# Patient Record
Sex: MALE | Race: WHITE | NOT HISPANIC OR LATINO | RURAL
[De-identification: names, ages, dates, MRNs, and addresses within clinical notes are randomized per-mention and may not be internally consistent; named-entity substitution may affect disease eponyms.]

---

## 2024-04-04 ENCOUNTER — OFFICE VISIT (OUTPATIENT)
Dept: FAMILY MEDICINE | Facility: CLINIC | Age: 79
End: 2024-04-04
Payer: MEDICARE

## 2024-04-04 VITALS
DIASTOLIC BLOOD PRESSURE: 74 MMHG | BODY MASS INDEX: 22.59 KG/M2 | RESPIRATION RATE: 16 BRPM | HEIGHT: 71 IN | OXYGEN SATURATION: 98 % | HEART RATE: 73 BPM | WEIGHT: 161.38 LBS | TEMPERATURE: 98 F | SYSTOLIC BLOOD PRESSURE: 139 MMHG

## 2024-04-04 DIAGNOSIS — H81.03 MENIERE'S DISEASE OF BOTH EARS: Primary | ICD-10-CM

## 2024-04-04 DIAGNOSIS — L23.7 ALLERGIC CONTACT DERMATITIS DUE TO PLANTS, EXCEPT FOOD: ICD-10-CM

## 2024-04-04 DIAGNOSIS — R11.0 NAUSEA: ICD-10-CM

## 2024-04-04 PROBLEM — I25.118 ATHSCL HEART DISEASE OF NATIVE COR ART W OTH ANG PCTRS: Status: ACTIVE | Noted: 2022-06-16

## 2024-04-04 PROBLEM — E78.2 MIXED HYPERLIPIDEMIA: Status: ACTIVE | Noted: 2019-12-11

## 2024-04-04 PROBLEM — I10 HYPERTENSION, ESSENTIAL: Status: ACTIVE | Noted: 2019-12-11

## 2024-04-04 PROCEDURE — 99203 OFFICE O/P NEW LOW 30 MIN: CPT | Mod: ,,, | Performed by: FAMILY MEDICINE

## 2024-04-04 RX ORDER — EZETIMIBE 10 MG/1
10 TABLET ORAL DAILY
COMMUNITY

## 2024-04-04 RX ORDER — MELOXICAM 15 MG/1
15 TABLET ORAL DAILY
COMMUNITY

## 2024-04-04 RX ORDER — OLMESARTAN MEDOXOMIL, AMLODIPINE AND HYDROCHLOROTHIAZIDE TABLET 20/5/12.5 MG 20; 5; 12.5 MG/1; MG/1; MG/1
1 TABLET ORAL DAILY
COMMUNITY

## 2024-04-04 RX ORDER — VIT C/E/ZN/COPPR/LUTEIN/ZEAXAN 250MG-90MG
1000 CAPSULE ORAL DAILY
COMMUNITY

## 2024-04-04 RX ORDER — PREDNISONE 20 MG/1
20 TABLET ORAL DAILY
Qty: 5 TABLET | Refills: 0 | Status: SHIPPED | OUTPATIENT
Start: 2024-04-04 | End: 2024-04-09

## 2024-04-04 RX ORDER — ONDANSETRON 4 MG/1
4 TABLET, FILM COATED ORAL EVERY 8 HOURS PRN
Qty: 20 TABLET | Refills: 0 | Status: SHIPPED | OUTPATIENT
Start: 2024-04-04

## 2024-04-04 RX ORDER — METAPROTERENOL SULFATE 10 MG
1 TABLET ORAL DAILY
COMMUNITY

## 2024-04-04 RX ORDER — ASPIRIN 81 MG/1
81 TABLET ORAL DAILY
COMMUNITY

## 2024-04-04 RX ORDER — GLUCOSAM/CHONDRO/HERB 149/HYAL 750-100 MG
1000 TABLET ORAL DAILY
COMMUNITY

## 2024-04-04 RX ORDER — ASCORBIC ACID 500 MG
500 TABLET ORAL DAILY
COMMUNITY

## 2024-04-04 RX ORDER — POLYETHYLENE GLYCOL 3350 17 G/17G
17 POWDER, FOR SOLUTION ORAL
COMMUNITY

## 2024-04-04 RX ORDER — DIAZEPAM 5 MG/1
5 TABLET ORAL EVERY 8 HOURS PRN
Qty: 20 TABLET | Refills: 0 | Status: SHIPPED | OUTPATIENT
Start: 2024-04-04 | End: 2024-05-04

## 2024-04-04 NOTE — PROGRESS NOTES
"Clinic Note    Patient Name: Ryan Milan  : 1945  MRN: [id number]    Chief Complaint   Patient presents with    Dizziness     States symptom started about day before yesterday around 8p.m. and has progressively gotten worse.     Rash     States left wrist area itching. States not sure if poison oak or ivy.     [facility]    Health Maintenance     Hepatitis C Screening Never done  Lipid Panel Never done  COVID-19 Vaccine(1) Never done  TETANUS VACCINE Never done  Shingles Vaccine(1 of 2) Never done  RSV Vaccine (Age 60+ and Pregnant patients)(1 - 1-dose 60+ series) Never done  Pneumococcal Vaccines (Age 65+)(1 of 1 - PCV) Never done  Influenza Vaccine(1) Never done         HPI:    Mr. Ryan Milan is a 78 y.o. male who presents to clinic today with CC of dizziness X 2 days. Reports it has progressively worsened. Reports he has a h/o Meniere's Disease. Reports it feels similar to prior attacks.  Reports rash to L wrist with itching. Reports that he is not sure if it is poison oak or poison ivy. States he broke out several days ago after he did some yard work.  Reports he was diagnosed with Meniere's disease years ago by Dr. Saba. States, however, he ended up at the [facility] in [place] and it took a long time to get the initial episode under control.  Reports previously valium has worked well when he has a flare. Reports he typically has a flare about once per year.   Reports he is typically nauseated with this but reports nausea only started this morning. States he has, however, not vomited.  Denies chest pain. Denies SOB. Reports he does have a Cardiologist for "check ups". Reports at some point in the past he was told to take 81 mg ASA. Reports, however, other than a family history of heart disease he has no personal history.  Denies significant sinus congestion or recent cough. Reports that he typically does not get sick. Reports, however, he did have COVID-19 several years ago. " Approximately 1.5 years ago he had a temporal HA to L side. Reports it would not go away. Reports it did temporarily improve with advil. Reports he went to an ophthalmologist because he has a h/o glaucoma and cataracts. He was also concerned about giant cell arteritis. Reports he had a full work up with MRI/etc. Reports he told him that he thought he had a sinus infection. Reports he took augmentin. Reports, however, this had no affect on headache. Reports, however, the headache completely resolved 3 months later.   Patient is, otherwise, without complaints.     Medications:  Medication List with Changes/Refills   New Medications    DIAZEPAM (VALIUM) 5 MG TABLET    Take 1 tablet (5 mg total) by mouth every 8 (eight) hours as needed (ears/dizzines).    ONDANSETRON (ZOFRAN) 4 MG TABLET    Take 1 tablet (4 mg total) by mouth every 8 (eight) hours as needed for Nausea.    PREDNISONE (DELTASONE) 20 MG TABLET    Take 1 tablet (20 mg total) by mouth once daily. for 5 days   Current Medications    ASCORBIC ACID, VITAMIN C, (VITAMIN C) 500 MG TABLET    Take 500 mg by mouth once daily.    ASPIRIN (ECOTRIN) 81 MG EC TABLET    Take 81 mg by mouth once daily.    CHOLECALCIFEROL, VITAMIN D3, (VITAMIN D3) 25 MCG (1,000 UNIT) CAPSULE    Take 1,000 Units by mouth once daily.    EZETIMIBE (ZETIA) 10 MG TABLET    Take 10 mg by mouth once daily.    FLAXSEED-EVENING PRIM-BILBERRY 250-125-10 MG CAP    Take 2 capsules by mouth.    MELOXICAM (MOBIC) 15 MG TABLET    Take 15 mg by mouth once daily.    MULTIVITAMIN WITH MINERALS TABLET    Take 2 capsules by mouth once daily.    NETTLE-PUMPKIN-SAW PALM-MIN 17 (PROSTATE THERAPY) CAP    Take 1 tablet by mouth Daily. Life extension Ultra Prostate Formula; pt buys OTC    OLMESARTAN-AMLODIPIN-HCTHIAZID 20-5-12.5 MG TAB    Take 1 tablet by mouth Daily.    OMEGA 3-DHA-EPA-FISH OIL 1,000 MG (120 MG-180 MG) CAP    Take 1,000 mg by mouth Daily.    POLYETHYLENE GLYCOL (GLYCOLAX) 17 GRAM PWPK    Take 17 g  "by mouth.        Allergies: Atorvastatin, Iodinated contrast media, and Rosuvastatin      Past Medical History:    Past Medical History:   Diagnosis Date    BPH (benign prostatic hyperplasia)     Cataract     CKD (chronic kidney disease)     Glaucoma     Meniere disease     Personal history of kidney stones        Past Surgical History:    Past Surgical History:   Procedure Laterality Date    CATARACT EXTRACTION      CIRCUMCISION      at age 13    LITHOTRIPSY      TONSILLECTOMY           Social History:    Social History     Tobacco Use   Smoking Status Never   Smokeless Tobacco Never     Social History     Substance and Sexual Activity   Alcohol Use Never     Social History     Substance and Sexual Activity   Drug Use Never         Family History:    Family History   Problem Relation Age of Onset    Thyroid disease Mother     Heart attack Father     Hodgkin's lymphoma Father        Review of Systems:    Review of Systems   Constitutional:  Negative for appetite change, chills, fatigue, fever and unexpected weight change.   Eyes:  Negative for visual disturbance.   Respiratory:  Negative for cough and shortness of breath.    Cardiovascular:  Negative for chest pain and leg swelling.   Gastrointestinal:  Positive for nausea. Negative for abdominal pain, change in bowel habit, constipation, diarrhea and vomiting.   Musculoskeletal:  Negative for arthralgias.   Integumentary:  Positive for rash.   Neurological:  Positive for dizziness. Negative for headaches.   Psychiatric/Behavioral:  The patient is not nervous/anxious.         Vitals:    Vitals:    04/04/24 0816   BP: 139/74   BP Location: Left arm   Patient Position: Sitting   BP Method: Medium (Automatic)   Pulse: 73   Resp: 16   Temp: 97.5 °F (36.4 °C)   TempSrc: Oral   SpO2: 98%   Weight: 73.2 kg (161 lb 6.4 oz)   Height: 5' 10.5" (1.791 m)       Body mass index is 22.83 kg/m².    Wt Readings from Last 3 Encounters:   04/04/24 0816 73.2 kg (161 lb 6.4 oz)    "     Physical Exam:    Physical Exam  Constitutional:       General: He is not in acute distress.     Appearance: Normal appearance.   HENT:      Right Ear: Tympanic membrane normal.      Left Ear: Tympanic membrane normal.      Nose: Nose normal.      Mouth/Throat:      Mouth: Mucous membranes are moist.      Pharynx: Oropharynx is clear.   Eyes:      Conjunctiva/sclera: Conjunctivae normal.   Cardiovascular:      Rate and Rhythm: Normal rate and regular rhythm.      Heart sounds: Normal heart sounds. No murmur heard.  Pulmonary:      Effort: Pulmonary effort is normal. No respiratory distress.      Breath sounds: Normal breath sounds. No wheezing, rhonchi or rales.   Abdominal:      General: Bowel sounds are normal.      Palpations: Abdomen is soft.      Tenderness: There is no abdominal tenderness.   Musculoskeletal:      Cervical back: Neck supple.      Right lower leg: No edema.      Left lower leg: No edema.   Skin:     Findings: Rash present.   Neurological:      General: No focal deficit present.      Mental Status: He is alert. Mental status is at baseline.   Psychiatric:         Mood and Affect: Mood normal.         Assessment/Plan:   1. Meniere's disease of both ears  -     diazePAM (VALIUM) 5 MG tablet; Take 1 tablet (5 mg total) by mouth every 8 (eight) hours as needed (ears/dizzines).  Dispense: 20 tablet; Refill: 0 -  reviewed and appropriate. For short term use only. UDS deferred.     2. Nausea  -     ondansetron (ZOFRAN) 4 MG tablet; Take 1 tablet (4 mg total) by mouth every 8 (eight) hours as needed for Nausea.  Dispense: 20 tablet; Refill: 0    3. Allergic contact dermatitis due to plants, except food  -     predniSONE (DELTASONE) 20 MG tablet; Take 1 tablet (20 mg total) by mouth once daily. 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  Patient voiced understanding and is agreeable to plan.      Jessica Sánchez MD    Family Medicine

## 2024-06-25 ENCOUNTER — OFFICE VISIT (OUTPATIENT)
Dept: FAMILY MEDICINE | Facility: CLINIC | Age: 79
End: 2024-06-25
Payer: MEDICARE

## 2024-06-25 VITALS
SYSTOLIC BLOOD PRESSURE: 138 MMHG | OXYGEN SATURATION: 98 % | TEMPERATURE: 97 F | BODY MASS INDEX: 22.65 KG/M2 | WEIGHT: 161.81 LBS | HEIGHT: 71 IN | DIASTOLIC BLOOD PRESSURE: 78 MMHG | HEART RATE: 65 BPM | RESPIRATION RATE: 18 BRPM

## 2024-06-25 DIAGNOSIS — H81.03 MENIERE'S DISEASE OF BOTH EARS: Primary | ICD-10-CM

## 2024-06-25 DIAGNOSIS — H65.93 FLUID LEVEL BEHIND TYMPANIC MEMBRANE OF BOTH EARS: ICD-10-CM

## 2024-06-25 DIAGNOSIS — I10 HYPERTENSION, ESSENTIAL: ICD-10-CM

## 2024-06-25 DIAGNOSIS — I25.118 ATHSCL HEART DISEASE OF NATIVE COR ART W OTH ANG PCTRS: ICD-10-CM

## 2024-06-25 DIAGNOSIS — R42 DIZZINESS: ICD-10-CM

## 2024-06-25 DIAGNOSIS — R11.0 NAUSEA: ICD-10-CM

## 2024-06-25 PROCEDURE — 99213 OFFICE O/P EST LOW 20 MIN: CPT | Mod: ,,, | Performed by: FAMILY MEDICINE

## 2024-06-25 RX ORDER — DIAZEPAM 5 MG/1
5 TABLET ORAL EVERY 8 HOURS PRN
Qty: 20 TABLET | Refills: 0 | Status: SHIPPED | OUTPATIENT
Start: 2024-06-25 | End: 2024-07-25

## 2024-06-25 RX ORDER — PREDNISONE 20 MG/1
20 TABLET ORAL DAILY
Qty: 5 TABLET | Refills: 0 | Status: SHIPPED | OUTPATIENT
Start: 2024-06-25 | End: 2024-06-30

## 2024-06-25 RX ORDER — ONDANSETRON 4 MG/1
4 TABLET, FILM COATED ORAL EVERY 8 HOURS PRN
Qty: 20 TABLET | Refills: 0 | Status: SHIPPED | OUTPATIENT
Start: 2024-06-25

## 2024-06-25 NOTE — PROGRESS NOTES
Clinic Note    Patient Name: Ryan Milan  : 1945  MRN: [id number]    Chief Complaint   Patient presents with    Dizziness    Emesis       HPI:    Mr. Ryan Milan is a 78 y.o. male who presents to clinic today with CC of dizziness and emesis X 3 days. Reports initially he did not have nausea but the nausea/vomiting started last night. Reports he vomited twice last night and twice this morning.   Patient has a h/o Meniere's disease. Reports he typically has flares once or twice per year over the past 30 years. Reports that recent flares have been more frequent than he has previously had them in the past. His last flare was in early April of this year. 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 Reports he does not get enough sleep. Denies any other symptoms including HA, numbness, tingling, weakness, or slurred speech. He reports this is typical of his prior flares with Meniere's Disease.  He previously followed with ENT (Dr. Saba) but he has since retired. He has not seen an ENT recently.   Denies HA. BP is up some today. Reports BP has been well controlled. Reports he has a h/o migraines but rarely has HA.   Patient is, otherwise, without complaints.     Medications:  Medication List with Changes/Refills   New Medications    PREDNISONE (DELTASONE) 20 MG TABLET    Take 1 tablet (20 mg total) by mouth once daily. for 5 days   Current Medications    ASCORBIC ACID, VITAMIN C, (VITAMIN C) 500 MG TABLET    Take 500 mg by mouth once daily.    ASPIRIN (ECOTRIN) 81 MG EC TABLET    Take 81 mg by mouth once daily.    CHOLECALCIFEROL, VITAMIN D3, (VITAMIN D3) 25 MCG (1,000 UNIT) CAPSULE    Take 1,000 Units by mouth once daily.    EZETIMIBE (ZETIA) 10 MG TABLET    Take 10 mg by mouth once daily.    FLAXSEED-EVENING PRIM-BILBERRY 250-125-10 MG CAP    Take 2 capsules by mouth.    MELOXICAM (MOBIC) 15 MG TABLET    Take 15 mg by mouth once  daily.    MULTIVITAMIN WITH MINERALS TABLET    Take 2 capsules by mouth once daily.    NETTLE-PUMPKIN-SAW PALM-MIN 17 (PROSTATE THERAPY) CAP    Take 1 tablet by mouth Daily. Life extension Ultra Prostate Formula; pt buys OTC    OLMESARTAN-AMLODIPIN-HCTHIAZID 20-5-12.5 MG TAB    Take 1 tablet by mouth Daily.    OMEGA 3-DHA-EPA-FISH OIL 1,000 MG (120 MG-180 MG) CAP    Take 1,000 mg by mouth Daily.    POLYETHYLENE GLYCOL (GLYCOLAX) 17 GRAM PWPK    Take 17 g by mouth.   Changed and/or Refilled Medications    Modified Medication Previous Medication    DIAZEPAM (VALIUM) 5 MG TABLET diazePAM (VALIUM) 5 MG tablet       Take 1 tablet (5 mg total) by mouth every 8 (eight) hours as needed (ears/dizzines).    Take 1 tablet (5 mg total) by mouth every 8 (eight) hours as needed (ears/dizzines).    ONDANSETRON (ZOFRAN) 4 MG TABLET ondansetron (ZOFRAN) 4 MG tablet       Take 1 tablet (4 mg total) by mouth every 8 (eight) hours as needed for Nausea.    Take 1 tablet (4 mg total) by mouth every 8 (eight) hours as needed for Nausea.        Allergies: Atorvastatin, Iodinated contrast media, and Rosuvastatin      Past Medical History:    Past Medical History:   Diagnosis Date    BPH (benign prostatic hyperplasia)     Cataract     CKD (chronic kidney disease)     Glaucoma     Meniere disease     Personal history of kidney stones        Past Surgical History:    Past Surgical History:   Procedure Laterality Date    CATARACT EXTRACTION      CIRCUMCISION      at age 13    LITHOTRIPSY      TONSILLECTOMY           Social History:    Social History     Tobacco Use   Smoking Status Never   Smokeless Tobacco Never     Social History     Substance and Sexual Activity   Alcohol Use Never     Social History     Substance and Sexual Activity   Drug Use Never         Family History:    Family History   Problem Relation Name Age of Onset    Thyroid disease Mother      Heart attack Father      Hodgkin's lymphoma Father         Review of  "Systems:    Review of Systems   Constitutional:  Negative for appetite change, chills, fatigue, fever and unexpected weight change.   Eyes:  Negative for visual disturbance.   Respiratory:  Negative for cough and shortness of breath.    Cardiovascular:  Negative for chest pain and leg swelling.   Gastrointestinal:  Positive for nausea and vomiting. Negative for abdominal pain, change in bowel habit, constipation and diarrhea.   Musculoskeletal:  Negative for arthralgias.   Integumentary:  Negative for rash.   Neurological:  Positive for dizziness. Negative for headaches.   Psychiatric/Behavioral:  The patient is not nervous/anxious.         Vitals:    Vitals:    06/25/24 0855 06/25/24 0952   BP: (!) 161/65 138/78   BP Location: Left arm Left arm   Patient Position: Sitting Sitting   BP Method: Large (Automatic) Large (Automatic)   Pulse: 65    Resp: 18    Temp: 97.4 °F (36.3 °C)    TempSrc: Oral    SpO2: 98%    Weight: 73.4 kg (161 lb 12.8 oz)    Height: 5' 10.5" (1.791 m)        Body mass index is 22.89 kg/m².    Wt Readings from Last 3 Encounters:   06/25/24 0855 73.4 kg (161 lb 12.8 oz)   04/04/24 0816 73.2 kg (161 lb 6.4 oz)        Physical Exam:    Physical Exam  Constitutional:       General: He is not in acute distress.     Appearance: Normal appearance.   HENT:      Ears:      Comments: + fluid level     Nose: Nose normal.      Mouth/Throat:      Mouth: Mucous membranes are moist.      Pharynx: Oropharynx is clear.   Eyes:      Conjunctiva/sclera: Conjunctivae normal.   Cardiovascular:      Rate and Rhythm: Normal rate and regular rhythm.      Heart sounds: Normal heart sounds. No murmur heard.  Pulmonary:      Effort: Pulmonary effort is normal. No respiratory distress.      Breath sounds: Normal breath sounds. No wheezing, rhonchi or rales.   Abdominal:      General: Bowel sounds are normal.      Palpations: Abdomen is soft.      Tenderness: There is no abdominal tenderness.   Musculoskeletal:      " Cervical back: Neck supple.      Right lower leg: No edema.      Left lower leg: No edema.   Skin:     Findings: No rash.   Neurological:      General: No focal deficit present.      Mental Status: He is alert. Mental status is at baseline.      Cranial Nerves: No cranial nerve deficit.   Psychiatric:         Mood and Affect: Mood normal.       Assessment/Plan:   1. Meniere's disease of both ears  -     Ambulatory referral/consult to ENT; Future; Expected date: 07/02/2024 - referral to Dr. Porras due to the increasing in frequency of symptoms  -     diazePAM (VALIUM) 5 MG tablet; Take 1 tablet (5 mg total) by mouth every 8 (eight) hours as needed (ears/dizzines).  Dispense: 20 tablet; Refill: 0 -  reviewed and appropriate. UDS deferred as this is for short term use only. This has previously worked well for stopping symptoms.  -     predniSONE (DELTASONE) 20 MG tablet; Take 1 tablet (20 mg total) by mouth once daily. for 5 days  Dispense: 5 tablet; Refill: 0    2. Hypertension, essential  The current medical regimen is effective;  continue present plan and medications.  - DASH diet and exercise    3. Nausea  -     ondansetron (ZOFRAN) 4 MG tablet; Take 1 tablet (4 mg total) by mouth every 8 (eight) hours as needed for Nausea.  Dispense: 20 tablet; Refill: 0    4. Dizziness  -     predniSONE (DELTASONE) 20 MG tablet; Take 1 tablet (20 mg total) by mouth once daily. for 5 days  Dispense: 5 tablet; Refill: 0    5. 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  - Patient has previously had a cardiac evaluation     6. Fluid level behind tympanic membrane of both ears  -     predniSONE (DELTASONE) 20 MG tablet; Take 1 tablet (20 mg total) by mouth once daily. 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  Patient voiced understanding and is agreeable to plan.      Jessica Sánchez MD    Family Medicine